# Patient Record
(demographics unavailable — no encounter records)

---

## 2024-12-27 NOTE — PHYSICAL EXAM
[General Appearance - Alert] : alert [General Appearance - In No Acute Distress] : in no acute distress [Oriented To Time, Place, And Person] : oriented to person, place, and time [Impaired Insight] : insight and judgment were intact [] : no respiratory distress [Respiration, Rhythm And Depth] : normal respiratory rhythm and effort [FreeTextEntry1] : AO3. Normally conversant. full affect. Follows commands, names, and repeats. Good attention.   Uncorrected, visual acuity was 20/200 in the right eye and 20/50 in the left eye.  PERRL, VFF, EOMI, no nystagmus, face symmetric, TUP at midline.    Motor:   R: L:  Del 5 5  Bi 5 5  Tri 5 5  Wrist Extensors 5 5  Finger abductors 5 5   5 5    HF 5 5  KE 5 5  KF 5 5  DF 5 5  PF 5 5    Tone R L  UE 0 0  LE 0 0    Sensory RUE LUE RLE LLE  LT + + + +  Vib + + + +  JPS + + + +  PP + + + +  Temp + + + +    Reflexes:   R L  Biceps 2 2  BR 2 2  Pat 3 3  AJ 2 2    TOES F F    Coordination:   R L  FTN 0 0  ROBI 0 0  HTS 0 0    Other    Gait: normal, can heel, toe, tandem     EDSS Step: 1.5    Functional system scores: Visual 0 Brainstem 0 Pyramidal 1 Cerebellar 1 Sensory 0 Bowel and bladder 1 Cerebral 0 Unrestricted gait.

## 2024-12-27 NOTE — ASSESSMENT
[FreeTextEntry1] : 36-year-old woman with newly diagnosed relapsing remitting multiple sclerosis since July 2024 on Ocrevus since October 2024. She initially presented with dysesthesia involving the bilateral hands as well as motor fatigability in the lower extremities and left wrist. Other systemic symptoms of fatigue, dizziness, chest tightness, ocular discomfort, and rash are unlikely primary neurological, and she has reportedly undergone a negative rheumatologic workup. MRI demonstrated multiple periventricular lesions with Saldaña's finger appearance, as well as a right cerebellar peduncle and subtle cervical subpial short segment lesion suggestive of multifocal demyelination. 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 CSF was oligoclonal bands positive. She meets 2017 [place] criteria for dissemination in space and dissemination in time.  I previously discussed the diagnosis with the patient providing insights into the natural history of the disease, contributory risk factors, lifestyle modification (diet, exercise, avoiding smoking, vitamin D supplementation), and recommendation for early treatment with an approved MS-specific disease modifying therapy, with preference for a high-efficacy agent ocrelizumab given risk factors for aggressive disease (e.g., brainstem and spinal cord involvement, incomplete recovery, recent active disease) and preferred route of administration and dosing interval.  Clinically stable from an MS standpoint, with fluctuating ocular pain with a slight drop in visual acuity and scotoma.  Deferred pulsed steroids given patient's sensitivity in the past.  Concerning for either residual inflammation, neural injury and scarring from prior demyelinating inflammatory optic neuropathy, or uveitis.  Plan:  Continue Ocrevus every 6 months  Check CBC, comp, immunoglobulins and T-cell subset  MR orbits with and without gadolinium ASAP.  Patient reportedly needs pretreatment with steroids and Benadryl prior to MRI for gadolinium allergy which was prescribed by primary care.  I asked her to follow-up with primary care to receive this pretreatment in anticipation of the MRI.  MRI brain, C/T spine w/wo gadolinium prior to next infusion of Ocrevus  Vitamin D 2000 U oral supplement daily  Gabapentin 100 mg 3 times a day to address the ocular pain  Physical therapy and stay active with fall precautions.  Counselled on Uthoff phenomena, avoid excessive overheating when possible  Urology for urodynamics and management of neurogenic bladder symptoms. 3-month follow-up

## 2024-12-27 NOTE — HISTORY OF PRESENT ILLNESS
[FreeTextEntry1] : 36-year-old woman here for follow-up of relapsing remitting multiple sclerosis.  She was initially referred with a constellation of symptoms including sensory disturbance in bilateral hands and lower extremities. She states the numbness in her hands began around February 2022. Symptoms were intermittent and infrequent initially but have become more frequent for the past few weeks. By March 2023 she was developing dysesthesia/pins and needle sensation in her feet as well as a sense of motor fatigability of the legs and wobbliness at the knees with trouble walking. She has not had any falls. More recently over the past week she has developed a sense of left wrist extension weakness. She has been evaluated by ophthalmologist and rheumatologist for bilateral eye pain described as a punched eye feeling that response to prednisone drops and is associated with eyelid swelling and worsening with menses. She described other systemic symptoms including dizziness, fatigue, chest tightness that was evaluated at the emergency department with negative EKG troponin and chest x-ray. She was evaluated by rheumatologist for a butterfly rash and itchy skin that raised concern for a systemic autoimmune disorder but had negative serologic workup for Sjogren's and lupus. She recently underwent MRI imaging although these images were not available for direct review at this initial visit.  May 10, 2024 Patient reports symptomatic improvement since her virtual visit. She was given prednisone for a allergic reaction to gadolinium which caused activating features including agitation emotionality that is since resolved. She was feeling overall good after taking some time off work.  July 10, 2024 Since last visit she has developed left fifth digit hand numbness. No new weakness, gait dysfunction imbalance, diplopia loss of vision. No infectious symptoms.  August 12, 2024 I saw her in the office today for fluctuating right eye pain and diminished vision. She reports a chronic history of monthly right eye pain and poor bilateral vision. She states that the right eye pain occurs monthly. During routine age and sex appropriate cancer screening she was found to have an irregular breast mass and is scheduled for an excisional biopsy late August 2024.  December 27, 2024  She started on ocrevus in October 2024. She continues to have bilateral intermittent eye pain that is correlated with her menstrual cycle (during ovulation and menstruation, lasts 1-3 days), intermittent teeth pain (had it before diagnosis of MS and started having it again in September), intermittent wobbly sensation in her legs, tingling sensation mostly in her hands and feet, intermittent squeezing sensation in her torso and chest, and persistent L pinky numbness. She reports that the eye pain is also associated with photophobia and is concerned if her symptoms are due to ocular migraines. She is considering if migraine treatment will help with the eye pain. She denies any new symptoms. She does report improvement in her endurance and ability to hold urine since the last time she saw me in 12/2024. She reports that she needs a new PT script as her old PT script didn't include LE weakness so PT didn't work on her leg and feet strength.  When she first started the ocrevus, she had intermittent gum bleeding that has resolved and UTI and yeast infections that have resolved. Currently not having any side effects.  Patient reports no signs of optic neuritis on eye exam as per neuroophthalmologist Dr. 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 Hasn't had MRI brain and orbits w/wo IV contrast as patient is allergic to gadolinium but Dr. Salas wasn't convinced patient has optic neuritis  Insurance has changed since last Ocrevus infusion so she needs her insurance on file changed to get next ocrevus infusion. She is also asking for mi  PMH None  PSH None  FH No family history of autoimmune disease  SH   Medication Ocrevus every 6 months

## 2025-05-30 NOTE — HISTORY OF PRESENT ILLNESS
[FreeTextEntry1] : Subjective:   - Summary: 36-year-old woman with relapsing remitting multiple sclerosis (RRMS) diagnosed in July 2024, on Ocrevus since October 2024. Patient reports mild infusion reaction during last infusion in April 2025, which resolved with additional Benadryl and reduced infusion rate. Patient mentions leg fatigue and weakness, which improved after recent Ocrevus infusion. Patient also reports occasional swallowing issues and multiple allergies.   - Chief Complaint (CC): Follow-up for RRMS   - History of Present Illness (HPI): Patient was diagnosed with RRMS in July 2024 and started Ocrevus in October 2024. She experienced a mild infusion reaction during her last infusion in April 2025, which was managed with additional Benadryl and reduced infusion rate. Patient reports improvement in leg fatigue and weakness after recent Ocrevus infusion. She mentions occasional swallowing difficulties, which have not been a significant issue. Patient also has multiple allergies and is considering seeing an allergist/immunologist.   - Past Medical History: Relapsing Remitting Multiple Sclerosis diagnosed July 2024   - Past Surgical History:    - Family History:    - Social History:    - Review of Systems: Musculoskeletal: Reports leg fatigue and weakness, improving after Ocrevus infusion. Gastrointestinal: Occasional swallowing difficulties. Allergies: Multiple allergies reported.   - Medications: Ocrevus (ocrelizumab) since October 2024, Daily allergy medication   - Allergies: Multiple allergies reported, including contrast dye   Objective:   - Diagnostic Results: MRI in January showed no optic neuritis. A small nonenhancing ovoid FLAIR hyperintense lesion in the right centrum semiovale (series 6, image 22) is not definitely seen on prior study and favored to be new, although comparison slightly limited due to technical differences. Partially visualized paranasal sinuses: Mild scattered mucosal thickening more notably along the anterior ethmoid cells. Retention cyst in the right maxillary sinus.Incidental finding of a cyst, possibly requiring ENT follow-up. Labs from December were reportedly normal.   - Vital Signs:    - Physical Examination (PE): AO3. Normally conversant. full affect. Follows commands, names, and repeats. Good attention.   Previously Uncorrected, visual acuity was 20/200 in the right eye and 20/50 in the left eye., not retested today.  PERRL, VFF, EOMI, no nystagmus, face symmetric, TUP at midline.    Motor:   R: L:  Del 5 5  Bi 5 5  Tri 5 5  Wrist Extensors 5 5  Finger abductors 5 5   5 5    HF 5 5  KE 5 5  KF 5 5  DF 5 5  PF 5 5    Tone R L  UE 0 0  LE 0 0    Sensory RUE LUE RLE LLE  LT + + + +  Vib + + + +  JPS + + + +  PP + + + +  Temp + + + +    Reflexes:   R L  Biceps 2 2  BR 2 2  Pat 3 3  AJ 2 2    TOES F F    Coordination:   R L  FTN 0 0  ROBI 0 0  HTS 0 0    Other    Gait: normal, can heel, toe, tandem     EDSS Step: 1.5    Functional system scores: Visual 0 Brainstem 0 Pyramidal 1 Cerebellar 1 Sensory 0 Bowel and bladder 1 Cerebral 0 Unrestricted gait.   Assessment:   - Summary: 36-year-old woman with RRMS on Ocrevus therapy since October 2024. Patient is responding well to treatment with improvement in symptoms. Mild infusion reaction noted during last infusion. Patient has multiple allergies and occasional swallowing difficulties.   - Problems:     - Relapsing Remitting Multiple Sclerosis     - Multiple allergies     - Occasional swallowing difficulties     - History of mild infusion reaction to Ocrevus   - Differential Diagnosis:   Plan:   - Summary: Continue current treatment with Ocrevus. Monitor for infusion reactions and adjust infusion rate as needed. Order repeat labs and MRI without contrast. Recommend allergy/immunology consultation and physical therapy.   - Plan:     - Continue Ocrevus therapy     - Order repeat labs     - Order MRI of brain and spinal cord without contrast     - Adjust Ocrevus infusion protocol: slower infusion rate, additional premedication with Benadryl as needed     - Refer to local allergist/immunologist for evaluation and management of multiple allergies     - Recommend physical therapy for leg fatigue and weakness, to be prescribed by primary care physician     - Monitor swallowing difficulties; consider speech and swallow evaluation if symptoms worsen     - Follow-up appointment in 3 months     - Patient education on monitoring symptoms and when to seek medical attention     - Encourage patient to contact the office for any concerns or worsening symptoms